# Patient Record
Sex: FEMALE
[De-identification: names, ages, dates, MRNs, and addresses within clinical notes are randomized per-mention and may not be internally consistent; named-entity substitution may affect disease eponyms.]

---
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
## 2019-04-18 ENCOUNTER — APPOINTMENT (OUTPATIENT)
Dept: ALLERGY | Facility: CLINIC | Age: 19
End: 2019-04-18
Payer: COMMERCIAL

## 2019-04-18 VITALS
WEIGHT: 138 LBS | DIASTOLIC BLOOD PRESSURE: 84 MMHG | HEART RATE: 60 BPM | SYSTOLIC BLOOD PRESSURE: 100 MMHG | HEIGHT: 67 IN | RESPIRATION RATE: 16 BRPM | BODY MASS INDEX: 21.66 KG/M2

## 2019-04-18 DIAGNOSIS — B99.9 UNSPECIFIED INFECTIOUS DISEASE: ICD-10-CM

## 2019-04-18 PROCEDURE — 95018 ALL TSTG PERQ&IQ DRUGS/BIOL: CPT

## 2019-04-18 PROCEDURE — 95004 PERQ TESTS W/ALRGNC XTRCS: CPT

## 2019-04-18 PROCEDURE — 99244 OFF/OP CNSLTJ NEW/EST MOD 40: CPT | Mod: 25

## 2019-04-18 RX ORDER — DESOGESTREL AND ETHINYL ESTRADIOL AND ETHINYL ESTRADIOL 21-5 (28)
KIT ORAL
Refills: 0 | Status: ACTIVE | COMMUNITY

## 2019-04-18 NOTE — PHYSICAL EXAM
[Well Nourished] : well nourished [Alert] : alert [No Acute Distress] : no acute distress [Well Developed] : well developed [Healthy Appearance] : healthy appearance [Normal Pupil & Iris Size/Symmetry] : normal pupil and iris size and symmetry [No Discharge] : no discharge [Sclera Not Icteric] : sclera not icteric [Normal Nasal Mucosa] : the nasal mucosa was normal [Normal Lips/Tongue] : the lips and tongue were normal [Normal Dentition] : normal dentition [Normal Tonsils] : normal tonsils [No Neck Mass] : no neck mass was observed [No Oral Lesions or Ulcers] : no oral lesions or ulcers [Supple] : the neck was supple [No LAD] : no lymphadenopathy [No Thyroid Mass] : no thyroid mass [Normal Rate and Effort] : normal respiratory rhythm and effort [No Crackles] : no crackles [Bilateral Audible Breath Sounds] : bilateral audible breath sounds [Normal Rate] : heart rate was normal  [No murmur] : no murmur [Normal S1, S2] : normal S1 and S2 [Soft] : abdomen soft [Regular Rhythm] : with a regular rhythm [Not Distended] : not distended [Not Tender] : non-tender [Normal Axillary Lumph Nodes] : axillary [Normal Cervical Lymph Nodes] : cervical [No HSM] : no hepato-splenomegaly [No Rash] : no rash [Skin Intact] : skin intact  [No Skin Lesions] : no skin lesions [No Joint Swelling or Erythema] : no joint swelling or erythema [No Edema] : no edema [No clubbing] : no clubbing [Normal Mood] : mood was normal [No Cyanosis] : no cyanosis [Normal Affect] : affect was normal [Alert, Awake, Oriented as Age-Appropriate] : alert, awake, oriented as age appropriate

## 2019-04-19 LAB
CD16+CD56+ CELLS # BLD: 148 /UL
CD16+CD56+ CELLS NFR BLD: 10 %
CD19 CELLS NFR BLD: 136 /UL
CD3 CELLS # BLD: 1111 /UL
CD3 CELLS NFR BLD: 78 %
CD3+CD4+ CELLS # BLD: 574 /UL
CD3+CD4+ CELLS NFR BLD: 40 %
CD3+CD4+ CELLS/CD3+CD8+ CLL SPEC: 1.15 RATIO
CD3+CD8+ CELLS # SPEC: 501 /UL
CD3+CD8+ CELLS NFR BLD: 35 %
CELLS.CD3-CD19+/CELLS IN BLOOD: 10 %
HIV1+2 AB SPEC QL IA.RAPID: NONREACTIVE

## 2019-04-22 LAB
DEPRECATED KAPPA LC FREE/LAMBDA SER: 1.04 RATIO
IGA SER QL IEP: 166 MG/DL
IGG SER QL IEP: 991 MG/DL
IGG SUBSET TOTAL IGG: 958 MG/DL
IGG1 SER-MCNC: 611 MG/DL
IGG2 SER-MCNC: 273 MG/DL
IGG3 SER-MCNC: 75 MG/DL
IGG4 SER-MCNC: 31 MG/DL
IGM SER QL IEP: 133 MG/DL
KAPPA LC CSF-MCNC: 0.98 MG/DL
KAPPA LC SERPL-MCNC: 1.02 MG/DL

## 2019-04-23 LAB
C DIPHTHERIAE AB SER QL: 1.54 IU/ML
C TETANI IGG SER-ACNC: 1.23 IU/ML

## 2019-04-29 LAB — C2 SERPL-MCNC: 2.4 MG/DL

## 2019-05-06 ENCOUNTER — OTHER (OUTPATIENT)
Age: 19
End: 2019-05-06

## 2019-05-14 LAB
DEPRECATED S PNEUM 1 IGG SER-MCNC: 6 MCG/ML
DEPRECATED S PNEUM12 AB SER-ACNC: 3.3 MCG/ML
DEPRECATED S PNEUM14 AB SER-ACNC: 7 MCG/ML
DEPRECATED S PNEUM17 IGG SER IA-MCNC: 10 MCG/ML
DEPRECATED S PNEUM18 IGG SER IA-MCNC: 0.6 MCG/ML
DEPRECATED S PNEUM19 IGG SER-MCNC: 15.8 MCG/ML
DEPRECATED S PNEUM19 IGG SER-MCNC: 22.6 MCG/ML
DEPRECATED S PNEUM2 IGG SER-MCNC: 5.2 MCG/ML
DEPRECATED S PNEUM20 IGG SER-MCNC: 1.9 MCG/ML
DEPRECATED S PNEUM22 IGG SER-MCNC: 17.9 MCG/ML
DEPRECATED S PNEUM23 AB SER-ACNC: 28.2 MCG/ML
DEPRECATED S PNEUM3 AB SER-ACNC: 2.9 MCG/ML
DEPRECATED S PNEUM34 IGG SER-MCNC: 6.7 MCG/ML
DEPRECATED S PNEUM4 AB SER-ACNC: 0.4 MCG/ML
DEPRECATED S PNEUM5 IGG SER-MCNC: 3.6 MCG/ML
DEPRECATED S PNEUM6 IGG SER-MCNC: 3.3 MCG/ML
DEPRECATED S PNEUM7 IGG SER-ACNC: 15.2 MCG/ML
DEPRECATED S PNEUM8 AB SER-ACNC: 2.3 MCG/ML
DEPRECATED S PNEUM9 AB SER-ACNC: 2.8 MCG/ML
DEPRECATED S PNEUM9 IGG SER-MCNC: 2 MCG/ML
STREPTOCOCCUS PNEUMONIAE SEROTYPE 11A: 1.4 MCG/ML
STREPTOCOCCUS PNEUMONIAE SEROTYPE 15B: 1.7 MCG/ML
STREPTOCOCCUS PNEUMONIAE SEROTYPE 33F: 4.8 MCG/ML

## 2019-05-15 ENCOUNTER — OTHER (OUTPATIENT)
Age: 19
End: 2019-05-15

## 2019-05-16 NOTE — HISTORY OF PRESENT ILLNESS
[Asthma] : asthma [Eczematous rashes] : eczematous rashes [Food Allergies] : food allergies [Venom Reactions] : venom reactions [de-identified] : Patient at [facility] and she developed strept throat and she was found to have pneumonia - partially collapsed right lung - treated with oral antibiotics with improvement.   Soon after that she developed hives.   She then consulted Allergist at [facility] - and treated with Xyzal with some improvement.    Since that time symptoms have continued.   Patient reports for the past 3 weeks lessening of her symptoms.   Last Xyzal was last week.    She has had swollen lips only with no oral symptoms.   Patient had normal blood work except for evidence of possible Lyme Disease.    \par \par Recurrent strept throat ongoing since 2017.   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   Continues with infections despite the surgery.  She had mononucleosis in 2017 - senior year of high school.   \par \par Bronchitis - coughing up discolored mucus - 1x\par Sinusitis - coughing up mucus - post nasal drip - nasal congestion - 2 x per year \par \par Seasonal allergies - Zyrtec

## 2019-05-16 NOTE — REVIEW OF SYSTEMS
[Urticaria] : urticaria [Nasal Congestion] : nasal congestion [Nl] : Endocrine [FreeTextEntry9] : left big toe - vascular surgery

## 2019-05-16 NOTE — CONSULT LETTER
[Dear  ___] : Dear  [unfilled], [Thank you for referring [unfilled] for consultation for _____] : Thank you for referring [unfilled] for consultation for [unfilled] [Sincerely,] : Sincerely, [Please see my note below.] : Please see my note below. [FreeTextEntry3] : Mitchell B. Boxer, M.D., FAAAAI\par [facility]\par \par Department of Allergy-Immunology\par [facility] \par [address] [place] [address]\par [place] [address]\par Tel:   [phone]\par Fax:  [phone]\par Email: [email or web address]\par

## 2019-05-16 NOTE — ASSESSMENT
[FreeTextEntry1] : Chronic idiopathic urticaria:\par \par Allegra 180 mg QAM\par Xyzal 5 mg QHS\par \par Recurrent infections:\par \par See immune work up \par \par Seasonal allergic rhinitis:\par \par Antihistamines as above\par \par Hazelnut skin test positive secondary to cross reacting allergens between spring pollens and hazelnut

## 2019-05-16 NOTE — SOCIAL HISTORY
[Mother] : mother [Brother] : brother [Father] : father [Sister] : sister [House] : [unfilled] lives in a house  [Radiator/Baseboard] : heating provided by radiator(s)/baseboard(s) [Window Units] : air conditioning provided by window units [Basement] :  in basement  [Cat] : cat [Single] : single [FreeTextEntry2] : 2 nd year - [employer]  [Bedroom] : not in the bedroom [Living Area] : not in the living area [de-identified] : x4 [Smokers in Household] : there are no smokers in the home

## 2019-06-26 ENCOUNTER — APPOINTMENT (OUTPATIENT)
Dept: ALLERGY | Facility: CLINIC | Age: 19
End: 2019-06-26

## 2020-06-29 ENCOUNTER — RESULT REVIEW (OUTPATIENT)
Age: 20
End: 2020-06-29

## 2023-10-25 ENCOUNTER — APPOINTMENT (OUTPATIENT)
Dept: ENDOCRINOLOGY | Facility: CLINIC | Age: 23
End: 2023-10-25

## 2025-04-19 ENCOUNTER — EMERGENCY (EMERGENCY)
Facility: HOSPITAL | Age: 25
LOS: 1 days | End: 2025-04-19
Attending: EMERGENCY MEDICINE | Admitting: EMERGENCY MEDICINE
Payer: COMMERCIAL

## 2025-04-19 VITALS
TEMPERATURE: 99 F | SYSTOLIC BLOOD PRESSURE: 106 MMHG | OXYGEN SATURATION: 97 % | DIASTOLIC BLOOD PRESSURE: 69 MMHG | HEART RATE: 96 BPM | RESPIRATION RATE: 16 BRPM

## 2025-04-19 VITALS
OXYGEN SATURATION: 100 % | HEART RATE: 111 BPM | RESPIRATION RATE: 16 BRPM | DIASTOLIC BLOOD PRESSURE: 72 MMHG | HEIGHT: 67 IN | WEIGHT: 141.98 LBS | TEMPERATURE: 99 F | SYSTOLIC BLOOD PRESSURE: 109 MMHG

## 2025-04-19 LAB
ALBUMIN SERPL ELPH-MCNC: 4.8 G/DL — SIGNIFICANT CHANGE UP (ref 3.3–5)
ALP SERPL-CCNC: 40 U/L — SIGNIFICANT CHANGE UP (ref 40–120)
ALT FLD-CCNC: 8 U/L — SIGNIFICANT CHANGE UP (ref 4–33)
ANION GAP SERPL CALC-SCNC: 14 MMOL/L — SIGNIFICANT CHANGE UP (ref 7–14)
APPEARANCE UR: CLEAR — SIGNIFICANT CHANGE UP
AST SERPL-CCNC: 14 U/L — SIGNIFICANT CHANGE UP (ref 4–32)
BASOPHILS # BLD AUTO: 0 K/UL — SIGNIFICANT CHANGE UP (ref 0–0.2)
BASOPHILS NFR BLD AUTO: 0 % — SIGNIFICANT CHANGE UP (ref 0–2)
BILIRUB SERPL-MCNC: 0.6 MG/DL — SIGNIFICANT CHANGE UP (ref 0.2–1.2)
BILIRUB UR-MCNC: NEGATIVE — SIGNIFICANT CHANGE UP
BUN SERPL-MCNC: 19 MG/DL — SIGNIFICANT CHANGE UP (ref 7–23)
C DIFF GDH STL QL: NEGATIVE — SIGNIFICANT CHANGE UP
C DIFF GDH STL QL: SIGNIFICANT CHANGE UP
CALCIUM SERPL-MCNC: 9.5 MG/DL — SIGNIFICANT CHANGE UP (ref 8.4–10.5)
CHLORIDE SERPL-SCNC: 106 MMOL/L — SIGNIFICANT CHANGE UP (ref 98–107)
CO2 SERPL-SCNC: 20 MMOL/L — LOW (ref 22–31)
COLOR SPEC: YELLOW — SIGNIFICANT CHANGE UP
CREAT SERPL-MCNC: 0.72 MG/DL — SIGNIFICANT CHANGE UP (ref 0.5–1.3)
DIFF PNL FLD: NEGATIVE — SIGNIFICANT CHANGE UP
EGFR: 118 ML/MIN/1.73M2 — SIGNIFICANT CHANGE UP
EGFR: 118 ML/MIN/1.73M2 — SIGNIFICANT CHANGE UP
EOSINOPHIL # BLD AUTO: 0 K/UL — SIGNIFICANT CHANGE UP (ref 0–0.5)
EOSINOPHIL NFR BLD AUTO: 0 % — SIGNIFICANT CHANGE UP (ref 0–6)
GLUCOSE SERPL-MCNC: 102 MG/DL — HIGH (ref 70–99)
GLUCOSE UR QL: NEGATIVE MG/DL — SIGNIFICANT CHANGE UP
HCG SERPL-ACNC: <1 MIU/ML — SIGNIFICANT CHANGE UP
HCT VFR BLD CALC: 39.9 % — SIGNIFICANT CHANGE UP (ref 34.5–45)
HGB BLD-MCNC: 13.4 G/DL — SIGNIFICANT CHANGE UP (ref 11.5–15.5)
IANC: 12.15 K/UL — HIGH (ref 1.8–7.4)
KETONES UR-MCNC: 40 MG/DL
LEUKOCYTE ESTERASE UR-ACNC: NEGATIVE — SIGNIFICANT CHANGE UP
LIDOCAIN IGE QN: 45 U/L — SIGNIFICANT CHANGE UP (ref 7–60)
LYMPHOCYTES # BLD AUTO: 0.34 K/UL — LOW (ref 1–3.3)
LYMPHOCYTES # BLD AUTO: 2.6 % — LOW (ref 13–44)
MCHC RBC-ENTMCNC: 27.4 PG — SIGNIFICANT CHANGE UP (ref 27–34)
MCHC RBC-ENTMCNC: 33.6 G/DL — SIGNIFICANT CHANGE UP (ref 32–36)
MCV RBC AUTO: 81.6 FL — SIGNIFICANT CHANGE UP (ref 80–100)
MONOCYTES # BLD AUTO: 0.68 K/UL — SIGNIFICANT CHANGE UP (ref 0–0.9)
MONOCYTES NFR BLD AUTO: 5.2 % — SIGNIFICANT CHANGE UP (ref 2–14)
NEUTROPHILS # BLD AUTO: 11.92 K/UL — HIGH (ref 1.8–7.4)
NEUTROPHILS NFR BLD AUTO: 85.2 % — HIGH (ref 43–77)
NEUTS BAND # BLD: 6.1 % — HIGH (ref 0–6)
NEUTS BAND NFR BLD: 6.1 % — HIGH (ref 0–6)
NITRITE UR-MCNC: NEGATIVE — SIGNIFICANT CHANGE UP
PH UR: 7.5 — SIGNIFICANT CHANGE UP (ref 5–8)
PLAT MORPH BLD: NORMAL — SIGNIFICANT CHANGE UP
PLATELET # BLD AUTO: 303 K/UL — SIGNIFICANT CHANGE UP (ref 150–400)
PLATELET COUNT - ESTIMATE: NORMAL — SIGNIFICANT CHANGE UP
POTASSIUM SERPL-MCNC: 4.1 MMOL/L — SIGNIFICANT CHANGE UP (ref 3.5–5.3)
POTASSIUM SERPL-SCNC: 4.1 MMOL/L — SIGNIFICANT CHANGE UP (ref 3.5–5.3)
PROT SERPL-MCNC: 7.7 G/DL — SIGNIFICANT CHANGE UP (ref 6–8.3)
PROT UR-MCNC: SIGNIFICANT CHANGE UP MG/DL
RBC # BLD: 4.89 M/UL — SIGNIFICANT CHANGE UP (ref 3.8–5.2)
RBC # FLD: 12.7 % — SIGNIFICANT CHANGE UP (ref 10.3–14.5)
RBC BLD AUTO: NORMAL — SIGNIFICANT CHANGE UP
SODIUM SERPL-SCNC: 140 MMOL/L — SIGNIFICANT CHANGE UP (ref 135–145)
SP GR SPEC: 1.04 — HIGH (ref 1–1.03)
UROBILINOGEN FLD QL: 0.2 MG/DL — SIGNIFICANT CHANGE UP (ref 0.2–1)
VARIANT LYMPHS # BLD: 0.9 % — SIGNIFICANT CHANGE UP (ref 0–6)
VARIANT LYMPHS NFR BLD MANUAL: 0.9 % — SIGNIFICANT CHANGE UP (ref 0–6)
WBC # BLD: 13.06 K/UL — HIGH (ref 3.8–10.5)
WBC # FLD AUTO: 13.06 K/UL — HIGH (ref 3.8–10.5)

## 2025-04-19 PROCEDURE — 99284 EMERGENCY DEPT VISIT MOD MDM: CPT

## 2025-04-19 PROCEDURE — 93010 ELECTROCARDIOGRAM REPORT: CPT

## 2025-04-19 RX ORDER — ONDANSETRON HCL/PF 4 MG/2 ML
4 VIAL (ML) INJECTION ONCE
Refills: 0 | Status: COMPLETED | OUTPATIENT
Start: 2025-04-19 | End: 2025-04-19

## 2025-04-19 RX ORDER — ACETAMINOPHEN 500 MG/5ML
1000 LIQUID (ML) ORAL ONCE
Refills: 0 | Status: COMPLETED | OUTPATIENT
Start: 2025-04-19 | End: 2025-04-19

## 2025-04-19 RX ORDER — ONDANSETRON HCL/PF 4 MG/2 ML
1 VIAL (ML) INJECTION
Qty: 8 | Refills: 0
Start: 2025-04-19 | End: 2025-04-20

## 2025-04-19 RX ADMIN — Medication 400 MILLIGRAM(S): at 11:35

## 2025-04-19 RX ADMIN — Medication 4 MILLIGRAM(S): at 11:36

## 2025-04-19 RX ADMIN — Medication 2000 MILLILITER(S): at 11:35

## 2025-04-19 NOTE — ED ADULT TRIAGE NOTE - CHIEF COMPLAINT QUOTE
Pt c/o body aches, chills, nausea, vomiting and diarrhea since 2:30 a.m. Reports unable to tolerate PO. Denies any chest pain, sob or recent sick contacts. No PMHx.

## 2025-04-19 NOTE — ED PROVIDER NOTE - CLINICAL SUMMARY MEDICAL DECISION MAKING FREE TEXT BOX
Pt with no PMH presents with n/v/d since 2am assoc with abd discomfort. 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 Report stool is watery, has noticed a few streaks of blood after vomiting repeatedly. No dysuria, back pain, did not take meds PTA. Pt non toxic appearing, EKG NSR with mild tach to 108, will hydrate, treat symptomatically, likely gastroenteritis, labs pending Pt with no PMH presents with n/v/d since 2am assoc with abd discomfort. 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 Report stool is watery, has noticed a few streaks of blood after vomiting repeatedly. No dysuria, back pain, did not take meds PTA. Pt non toxic appearing, abdomen is soft and nontender throughout, EKG NSR with mild tach to 108, will hydrate, treat symptomatically, likely gastroenteritis, labs pending

## 2025-04-19 NOTE — ED ADULT NURSE NOTE - OBJECTIVE STATEMENT
patient alert ox4 came in c/o N/V/D/ body aches/chills started at 2 am, states she vomited about 20 times and had about the same amount of diarrhea. denies CP/SOB. respirations even and unlabored . labs done and meds given as ordered. bed at lowest position. call bell provided safety maintained.  awaiting results.

## 2025-04-19 NOTE — ED PROVIDER NOTE - PROGRESS NOTE DETAILS
Labs with mildly elevated WBC and low lymphocytes which can be seen i/s/o viral illness. Noted to have 6.1% bands which is 0.1 above normal limit and is not concerning at this time as pt is well appearing. Stool studies sent, will have callback PA reach out to pt with results. Zofran sent to pharmacy. Pt able to tolerate PO here- ate saltines and drank ginger ale. Dc home with return precautions

## 2025-04-19 NOTE — ED PROVIDER NOTE - NSFOLLOWUPINSTRUCTIONS_ED_ALL_ED_FT
You were treated in the emergency department with IV fluids and a medication for nausea called Zofran.  This medication was sent to your pharmacy for you to take at home if you need it.  Recommend having a simple diet for the time being, you can drink water, drinks of electrolytes such as Gatorade or Powerade or coconut water.  Recommend starting with bland foods such as toast with butter, bananas, rice, applesauce and advance her diet as tolerated.  Please return to the emergency department if at home you are still unable to hold down food or liquid with the medication provided you have severe abdominal pain, fevers, blood in your stool or vomit or any new or worsening symptoms.  Please follow-up with your primary care doctor in the next 2 to 3 days. You will receive a phone call from our emergency department to give you the results of your stool studies.  In the meantime please clean all surfaces you come in contact with that others use as well.

## 2025-04-19 NOTE — ED PROVIDER NOTE - PHYSICAL EXAMINATION
GEN - NAD; well appearing; A+O x3   HEAD - NC/AT   ENT: Airway patent, mmm  NECK: Neck supple  PULMONARY - no resp distress  ABDOMEN - +BS, ND, NT, soft, no guarding, no rebound, no masses   BACK - no CVA tenderness, Normal  spine   EXTREMITIES - FROM, symmetric pulses, capillary refill < 2 seconds, no edema   NEUROLOGIC - alert, speech clear, moving all 4 ext spontaneously  PSYCH -nl mood/affect, nl insight.

## 2025-04-19 NOTE — ED PROVIDER NOTE - WET READ LAUNCH FT
Last Visit: 6/23/22 with KEHINDE Dow  Next Appointment: 9/23/22 with KEHINDE Dow  Previous Refill Encounter(s): 5/6/21 #40 with 2 refills    Requested Prescriptions     Pending Prescriptions Disp Refills    butalbital-acetaminophen-caffeine (FIORICET, ESGIC) -40 mg per tablet 40 Tablet 2     Sig: Take 1 Tablet by mouth every six (6) hours as needed for Headache. For [facility] in place:   Recommendation Provided To:    Intervention Detail: New Rx: 1, reason: Patient Preference  Gap Closed?:   Intervention Accepted By:   Time Spent (min): 5 There are no Wet Read(s) to document.

## 2025-04-19 NOTE — ED ADULT NURSE REASSESSMENT NOTE - NS ED NURSE REASSESS COMMENT FT1
patient tolerated PO well. feels better than when arrived. stools sent as ordered. d/c by provider. iv removed.

## 2025-04-19 NOTE — ED PROVIDER NOTE - PATIENT PORTAL LINK FT
You can access the FollowMyHealth Patient Portal offered by [facility] by registering at the following website: [email or web address]. By joining [facility]’s FollowMyHealth portal, you will also be able to view your health information using other applications (apps) compatible with our system.

## 2025-04-20 LAB
GI PCR PANEL: DETECTED
NOROVIRUS GI+II RNA STL QL NAA+NON-PROBE: DETECTED

## 2025-04-24 LAB
CULTURE RESULTS: SIGNIFICANT CHANGE UP
SPECIMEN SOURCE: SIGNIFICANT CHANGE UP